# Patient Record
Sex: FEMALE | Race: WHITE | NOT HISPANIC OR LATINO
[De-identification: names, ages, dates, MRNs, and addresses within clinical notes are randomized per-mention and may not be internally consistent; named-entity substitution may affect disease eponyms.]

---

## 2017-01-01 ENCOUNTER — TRANSCRIPTION ENCOUNTER (OUTPATIENT)
Age: 47
End: 2017-01-01

## 2017-02-18 ENCOUNTER — RX RENEWAL (OUTPATIENT)
Age: 47
End: 2017-02-18

## 2017-05-09 LAB — HBA1C MFR BLD HPLC: 5.2

## 2017-05-18 ENCOUNTER — APPOINTMENT (OUTPATIENT)
Dept: FAMILY MEDICINE | Facility: CLINIC | Age: 47
End: 2017-05-18

## 2017-05-18 ENCOUNTER — NON-APPOINTMENT (OUTPATIENT)
Age: 47
End: 2017-05-18

## 2017-05-18 VITALS
RESPIRATION RATE: 14 BRPM | TEMPERATURE: 98.4 F | HEART RATE: 60 BPM | DIASTOLIC BLOOD PRESSURE: 60 MMHG | HEIGHT: 62 IN | OXYGEN SATURATION: 98 % | WEIGHT: 124 LBS | SYSTOLIC BLOOD PRESSURE: 100 MMHG | BODY MASS INDEX: 22.82 KG/M2

## 2017-07-06 ENCOUNTER — APPOINTMENT (OUTPATIENT)
Dept: FAMILY MEDICINE | Facility: CLINIC | Age: 47
End: 2017-07-06

## 2017-07-06 VITALS — SYSTOLIC BLOOD PRESSURE: 106 MMHG | TEMPERATURE: 98.4 F | DIASTOLIC BLOOD PRESSURE: 60 MMHG

## 2017-07-06 DIAGNOSIS — F51.4 SLEEP TERRORS [NIGHT TERRORS]: ICD-10-CM

## 2017-07-06 DIAGNOSIS — S03.00XA DISLOCATION OF JAW, UNSPECIFIED SIDE, INITIAL ENCOUNTER: ICD-10-CM

## 2017-09-05 ENCOUNTER — TRANSCRIPTION ENCOUNTER (OUTPATIENT)
Age: 47
End: 2017-09-05

## 2017-09-28 ENCOUNTER — MEDICATION RENEWAL (OUTPATIENT)
Age: 47
End: 2017-09-28

## 2017-10-17 ENCOUNTER — APPOINTMENT (OUTPATIENT)
Dept: FAMILY MEDICINE | Facility: CLINIC | Age: 47
End: 2017-10-17
Payer: COMMERCIAL

## 2017-10-17 VITALS — TEMPERATURE: 98.2 F

## 2017-10-17 PROCEDURE — G0008: CPT

## 2017-10-17 PROCEDURE — 90688 IIV4 VACCINE SPLT 0.5 ML IM: CPT

## 2018-05-15 ENCOUNTER — APPOINTMENT (OUTPATIENT)
Dept: FAMILY MEDICINE | Facility: CLINIC | Age: 48
End: 2018-05-15
Payer: COMMERCIAL

## 2018-05-15 ENCOUNTER — NON-APPOINTMENT (OUTPATIENT)
Age: 48
End: 2018-05-15

## 2018-05-15 VITALS
OXYGEN SATURATION: 97 % | SYSTOLIC BLOOD PRESSURE: 130 MMHG | BODY MASS INDEX: 22.63 KG/M2 | RESPIRATION RATE: 17 BRPM | HEART RATE: 65 BPM | TEMPERATURE: 98.6 F | WEIGHT: 123 LBS | DIASTOLIC BLOOD PRESSURE: 70 MMHG | HEIGHT: 62 IN

## 2018-05-15 PROCEDURE — 93000 ELECTROCARDIOGRAM COMPLETE: CPT

## 2018-05-15 PROCEDURE — 99396 PREV VISIT EST AGE 40-64: CPT | Mod: 25

## 2018-05-15 RX ORDER — NAPROXEN 500 MG/1
500 TABLET ORAL
Qty: 30 | Refills: 1 | Status: COMPLETED | COMMUNITY
Start: 2017-07-06 | End: 2018-05-15

## 2018-05-15 NOTE — HEALTH RISK ASSESSMENT
[Patient reported mammogram was normal] : Patient reported mammogram was normal [Patient reported PAP Smear was normal] : Patient reported PAP Smear was normal [MammogramDate] : 4/17 [PapSmearDate] : 10/ 2017

## 2018-05-15 NOTE — PHYSICAL EXAM

## 2018-05-15 NOTE — ASSESSMENT
[FreeTextEntry1] : Pt presents for his routine physical exam. Patient states that  she is having less problems with her allergies. Patient is no longer grinding and clenching her teeth. Patient does however have some anxiety and stress especially before bed does take a quarter of a thallium every couple of nights to help with her sleep. Patient states that 4 weeks ago she developed a rash in her groin area which seems to be getting worse and spreading  not itchy\par EKG NSR\par Labs done today\par Pt to start Triamcinolone cream BID

## 2018-05-15 NOTE — HISTORY OF PRESENT ILLNESS
[de-identified] : Pt presents for his routine physical exam. Patient states that  she is having less problems with her allergies. Patient is no longer grinding and clenching her teeth. Patient does however have some anxiety and stress especially before bed does take a quarter of a thallium every couple of nights to help with her sleep. Patient states that 4 weeks ago she developed a rash in her groin area which seems to be getting worse and spreading  not itchy

## 2018-05-21 ENCOUNTER — MESSAGE (OUTPATIENT)
Age: 48
End: 2018-05-21

## 2018-06-23 LAB
25(OH)D3 SERPL-MCNC: 32.8 NG/ML
ALBUMIN SERPL ELPH-MCNC: 4.6 G/DL
ALP BLD-CCNC: 58 U/L
ALT SERPL-CCNC: 16 U/L
ANION GAP SERPL CALC-SCNC: 15 MMOL/L
AST SERPL-CCNC: 29 U/L
BASOPHILS # BLD AUTO: 0.02 K/UL
BASOPHILS NFR BLD AUTO: 0.3 %
BILIRUB SERPL-MCNC: 0.3 MG/DL
BUN SERPL-MCNC: 20 MG/DL
CALCIUM SERPL-MCNC: 9.6 MG/DL
CHLORIDE SERPL-SCNC: 104 MMOL/L
CHOLEST SERPL-MCNC: 165 MG/DL
CHOLEST/HDLC SERPL: 2.5 RATIO
CO2 SERPL-SCNC: 24 MMOL/L
CREAT SERPL-MCNC: 0.84 MG/DL
EOSINOPHIL # BLD AUTO: 0.1 K/UL
EOSINOPHIL NFR BLD AUTO: 1.4 %
FOLATE SERPL-MCNC: >20 NG/ML
GGT SERPL-CCNC: 5 U/L
GLUCOSE SERPL-MCNC: 61 MG/DL
HBA1C MFR BLD HPLC: 5.2 %
HCT VFR BLD CALC: 38.2 %
HDLC SERPL-MCNC: 67 MG/DL
HGB BLD-MCNC: 12.6 G/DL
IMM GRANULOCYTES NFR BLD AUTO: 0.1 %
IRON SERPL-MCNC: 65 UG/DL
LDLC SERPL CALC-MCNC: 80 MG/DL
LYMPHOCYTES # BLD AUTO: 2.18 K/UL
LYMPHOCYTES NFR BLD AUTO: 31.3 %
MAN DIFF?: NORMAL
MCHC RBC-ENTMCNC: 31.9 PG
MCHC RBC-ENTMCNC: 33 GM/DL
MCV RBC AUTO: 96.7 FL
MONOCYTES # BLD AUTO: 0.46 K/UL
MONOCYTES NFR BLD AUTO: 6.6 %
NEUTROPHILS # BLD AUTO: 4.2 K/UL
NEUTROPHILS NFR BLD AUTO: 60.3 %
PLATELET # BLD AUTO: 229 K/UL
POTASSIUM SERPL-SCNC: 4.6 MMOL/L
PROT SERPL-MCNC: 6.9 G/DL
RBC # BLD: 3.95 M/UL
RBC # FLD: 12.5 %
SODIUM SERPL-SCNC: 143 MMOL/L
TRIGL SERPL-MCNC: 91 MG/DL
TSH SERPL-ACNC: 3.01 UIU/ML
VIT B12 SERPL-MCNC: 933 PG/ML
WBC # FLD AUTO: 6.97 K/UL

## 2018-07-03 ENCOUNTER — APPOINTMENT (OUTPATIENT)
Dept: FAMILY MEDICINE | Facility: CLINIC | Age: 48
End: 2018-07-03
Payer: COMMERCIAL

## 2018-07-03 VITALS — SYSTOLIC BLOOD PRESSURE: 98 MMHG | DIASTOLIC BLOOD PRESSURE: 58 MMHG | TEMPERATURE: 98.6 F

## 2018-07-03 PROCEDURE — 87880 STREP A ASSAY W/OPTIC: CPT | Mod: QW

## 2018-07-03 PROCEDURE — 99213 OFFICE O/P EST LOW 20 MIN: CPT | Mod: 25

## 2018-07-03 NOTE — REVIEW OF SYSTEMS
[Fatigue] : fatigue [Sore Throat] : sore throat [Postnasal Drip] : postnasal drip [Negative] : Heme/Lymph

## 2018-07-03 NOTE — ASSESSMENT
[FreeTextEntry1] : Presents to office for\par  sore throat for the last 3 days. Patient states the sore throat is resolved he now has more upper respiratory symptoms with nasal congestion. Denies any fever or chills or coughing.Patient has been taking OTC meds with some relief.\par \par RST- neg \par pt instructed to start zinc continue meds

## 2018-07-03 NOTE — PHYSICAL EXAM
[No Acute Distress] : no acute distress [No JVD] : no jugular venous distention [Clear to Auscultation] : lungs were clear to auscultation bilaterally [Regular Rhythm] : with a regular rhythm [Normal Anterior Cervical Nodes] : no anterior cervical lymphadenopathy [de-identified] : mildly injected throat

## 2018-07-03 NOTE — HISTORY OF PRESENT ILLNESS
[FreeTextEntry8] : Presents to office for\par  sore throat for the last 3 days. Patient states the sore throat is resolved he now has more upper respiratory symptoms with nasal congestion. Denies any fever or chills or coughing.Patient has been taking OTC meds with some relief.\par

## 2018-07-06 LAB — S PYO AG SPEC QL IA: NEGATIVE

## 2018-09-10 ENCOUNTER — TRANSCRIPTION ENCOUNTER (OUTPATIENT)
Age: 48
End: 2018-09-10

## 2018-12-10 ENCOUNTER — TRANSCRIPTION ENCOUNTER (OUTPATIENT)
Age: 48
End: 2018-12-10

## 2019-06-02 ENCOUNTER — TRANSCRIPTION ENCOUNTER (OUTPATIENT)
Age: 49
End: 2019-06-02

## 2019-09-03 ENCOUNTER — APPOINTMENT (OUTPATIENT)
Dept: FAMILY MEDICINE | Facility: CLINIC | Age: 49
End: 2019-09-03
Payer: COMMERCIAL

## 2019-09-03 ENCOUNTER — NON-APPOINTMENT (OUTPATIENT)
Age: 49
End: 2019-09-03

## 2019-09-03 VITALS
HEART RATE: 60 BPM | RESPIRATION RATE: 14 BRPM | SYSTOLIC BLOOD PRESSURE: 104 MMHG | OXYGEN SATURATION: 98 % | DIASTOLIC BLOOD PRESSURE: 60 MMHG | TEMPERATURE: 99 F | HEIGHT: 62 IN | WEIGHT: 124 LBS | BODY MASS INDEX: 22.82 KG/M2

## 2019-09-03 DIAGNOSIS — J30.9 ALLERGIC RHINITIS, UNSPECIFIED: ICD-10-CM

## 2019-09-03 PROCEDURE — 99396 PREV VISIT EST AGE 40-64: CPT | Mod: 25

## 2019-09-03 PROCEDURE — 93000 ELECTROCARDIOGRAM COMPLETE: CPT

## 2019-09-03 NOTE — HISTORY OF PRESENT ILLNESS
[de-identified] : 49y/o female w/ PMHx of allergic rhinitis, chronic constipation \par \par presents to the office for routine annual physical exam.  \par  \par Offers no new complaints at this time. All routine labs review with patient and WNL \par   last Mammo in, last PAP, UTD. Last colonoscopy 10 years ago normal\par +FHx of Colon Cancer\par \par  \par

## 2019-09-03 NOTE — HEALTH RISK ASSESSMENT
[Patient reported mammogram was normal] : Patient reported mammogram was normal [Patient reported PAP Smear was normal] : Patient reported PAP Smear was normal [Patient reported colonoscopy was normal] : Patient reported colonoscopy was normal [MammogramDate] : 8/19 [PapSmearDate] : 1/19 [ColonoscopyDate] : 2009

## 2019-09-08 LAB
CYTOLOGY CVX/VAG DOC THIN PREP: NORMAL
MEV IGG FLD QL IA: >300 AU/ML
MEV IGG+IGM SER-IMP: POSITIVE

## 2019-09-23 ENCOUNTER — APPOINTMENT (OUTPATIENT)
Dept: FAMILY MEDICINE | Facility: CLINIC | Age: 49
End: 2019-09-23
Payer: COMMERCIAL

## 2019-09-23 VITALS — TEMPERATURE: 98.1 F

## 2019-09-23 DIAGNOSIS — Z87.09 PERSONAL HISTORY OF OTHER DISEASES OF THE RESPIRATORY SYSTEM: ICD-10-CM

## 2019-09-23 PROCEDURE — 99213 OFFICE O/P EST LOW 20 MIN: CPT | Mod: 25

## 2019-09-23 PROCEDURE — 87880 STREP A ASSAY W/OPTIC: CPT | Mod: QW

## 2019-09-23 NOTE — ADDENDUM
[FreeTextEntry1] : I, Bria Shirley, acted solely as a scribe for Dr. Stanford on this date 09/23/2019.\par \par All medical record entries made by the Scribe were at my, Dr. Stanford, direction and personally dictated by me on 09/23/2019. I have reviewed the chart and agree that the record accurately reflects my personal performance of the history, physical exam, assessment and plan.  I have also personally directed, reviewed and agreed with the chart.

## 2019-09-23 NOTE — PHYSICAL EXAM
[Normal] : normal rate, regular rhythm, normal S1/S2, no murmur [de-identified] : PND, throat injected, mucus [de-identified] : AA&Ox3

## 2019-09-23 NOTE — HISTORY OF PRESENT ILLNESS
[FreeTextEntry8] : 48y/o F with PMHx of Anxiety presents to the office with c/o URI symptoms that started 4 days ago with PND, then fever the next morning. Additional sx of sore throat, painful swallowing, and congestion. Pt has taken Claritin-D for the past 2 days which she reports relieves sx. She also took Sudafed yesterday, which did not help. Pt reports having surgery in 4 days.

## 2019-09-23 NOTE — PLAN
[FreeTextEntry1] : URI\par - Rapid Strep Test results: negative\par - Flonase 2x/day\par - Continue Claritin-D for 1 wk\par - Hydration / Supportive care\par - Hygiene caution to prevent transmission\par - Gargle with salt water,  Apple Cider Vinegar or boil,  strain, cool and drink sore throat cocktail - (red onion skins, lemon rinds, fresh alex and honey)

## 2019-09-23 NOTE — REVIEW OF SYSTEMS
[Nasal Discharge] : nasal discharge [Postnasal Drip] : postnasal drip [Sore Throat] : sore throat [Cough] : cough [Negative] : Integumentary

## 2019-09-24 LAB — S PYO AG SPEC QL IA: NEGATIVE

## 2020-03-11 ENCOUNTER — APPOINTMENT (OUTPATIENT)
Dept: FAMILY MEDICINE | Facility: CLINIC | Age: 50
End: 2020-03-11
Payer: COMMERCIAL

## 2020-03-11 VITALS
RESPIRATION RATE: 18 BRPM | TEMPERATURE: 97.9 F | OXYGEN SATURATION: 98 % | SYSTOLIC BLOOD PRESSURE: 108 MMHG | HEART RATE: 64 BPM | DIASTOLIC BLOOD PRESSURE: 64 MMHG

## 2020-03-11 PROCEDURE — 99212 OFFICE O/P EST SF 10 MIN: CPT

## 2020-03-11 NOTE — PHYSICAL EXAM
[No Lymphadenopathy] : no lymphadenopathy [Normal] : affect was normal and insight and judgment were intact

## 2020-03-12 NOTE — HISTORY OF PRESENT ILLNESS
[FreeTextEntry8] : 50 yo female with seasonal and environmental allergies presents to the office for clearance to return to work. the patient reports her  was recently in a building where someone tested positively for covid-19. the patient's  did not have direct contact with the positive patient, and the patient presents today and is asymptomatic.

## 2020-03-13 ENCOUNTER — APPOINTMENT (OUTPATIENT)
Dept: FAMILY MEDICINE | Facility: CLINIC | Age: 50
End: 2020-03-13

## 2020-09-22 ENCOUNTER — APPOINTMENT (OUTPATIENT)
Dept: FAMILY MEDICINE | Facility: CLINIC | Age: 50
End: 2020-09-22
Payer: COMMERCIAL

## 2020-09-22 VITALS — DIASTOLIC BLOOD PRESSURE: 70 MMHG | SYSTOLIC BLOOD PRESSURE: 110 MMHG | TEMPERATURE: 98.6 F

## 2020-09-22 DIAGNOSIS — Z87.09 PERSONAL HISTORY OF OTHER DISEASES OF THE RESPIRATORY SYSTEM: ICD-10-CM

## 2020-09-22 DIAGNOSIS — R59.9 ENLARGED LYMPH NODES, UNSPECIFIED: ICD-10-CM

## 2020-09-22 PROCEDURE — 99213 OFFICE O/P EST LOW 20 MIN: CPT

## 2020-09-22 NOTE — HISTORY OF PRESENT ILLNESS
[FreeTextEntry8] : 51 y/o F presents to office with a 7 day hx of sinus congestion and nasal discharge and now swollen gland right>Left. Took OTC meds without relief. Denies fever. Admits to sore throat,post nasal drip and fatigue.Denies HA or facial pain.

## 2020-09-22 NOTE — PHYSICAL EXAM
[Normal Outer Ear/Nose] : the outer ears and nose were normal in appearance [Normal] : no rash [de-identified] : right tonsil enlarged and injected [de-identified] : enlarged right cervical node TTP

## 2020-09-29 ENCOUNTER — APPOINTMENT (OUTPATIENT)
Dept: GASTROENTEROLOGY | Facility: CLINIC | Age: 50
End: 2020-09-29
Payer: COMMERCIAL

## 2020-09-29 VITALS
SYSTOLIC BLOOD PRESSURE: 104 MMHG | WEIGHT: 124 LBS | OXYGEN SATURATION: 97 % | HEIGHT: 62 IN | BODY MASS INDEX: 22.82 KG/M2 | TEMPERATURE: 98.5 F | DIASTOLIC BLOOD PRESSURE: 68 MMHG

## 2020-09-29 DIAGNOSIS — Z12.11 ENCOUNTER FOR SCREENING FOR MALIGNANT NEOPLASM OF COLON: ICD-10-CM

## 2020-09-29 PROCEDURE — 99242 OFF/OP CONSLTJ NEW/EST SF 20: CPT

## 2020-09-29 NOTE — ASSESSMENT
[FreeTextEntry1] : \par history of colon polyps\par \par Discussed risks of the procedure -  including but not limited to bleeding, infection, drug reaction, perforation and missed lesion.  Also discussed benefits and alternatives of this procedure with patient - including no treatment  - and the patient consents to undergoing the procedure.\par

## 2020-09-29 NOTE — HISTORY OF PRESENT ILLNESS
[FreeTextEntry1] : Furloughed form work at [employer]\par \par Had colonoscopy 2005 - has sen BRB on tp - told of hemorrhoids and one polyp\par \par Tends to get diarrhea before menses\par \par \par

## 2020-09-29 NOTE — REVIEW OF SYSTEMS
[Constipation] : constipation [Negative] : Respiratory [Fever] : no fever [Recent Weight Gain (___ Lbs)] : no recent weight gain [Recent Weight Loss (___ Lbs)] : no recent weight loss

## 2020-09-30 RX ORDER — SODIUM PICOSULFATE, MAGNESIUM OXIDE, AND ANHYDROUS CITRIC ACID 10; 3.5; 12 MG/160ML; G/160ML; G/160ML
10-3.5-12 MG-GM LIQUID ORAL
Qty: 1 | Refills: 0 | Status: ACTIVE | COMMUNITY
Start: 2020-09-30 | End: 1900-01-01

## 2020-10-04 ENCOUNTER — APPOINTMENT (OUTPATIENT)
Dept: DISASTER EMERGENCY | Facility: CLINIC | Age: 50
End: 2020-10-04

## 2020-10-05 LAB — SARS-COV-2 N GENE NPH QL NAA+PROBE: NOT DETECTED

## 2020-10-07 ENCOUNTER — LABORATORY RESULT (OUTPATIENT)
Age: 50
End: 2020-10-07

## 2020-10-07 ENCOUNTER — APPOINTMENT (OUTPATIENT)
Dept: GASTROENTEROLOGY | Facility: CLINIC | Age: 50
End: 2020-10-07
Payer: COMMERCIAL

## 2020-10-07 PROCEDURE — 81025 URINE PREGNANCY TEST: CPT

## 2020-10-07 PROCEDURE — 45380 COLONOSCOPY AND BIOPSY: CPT | Mod: 59

## 2020-10-07 PROCEDURE — 45385 COLONOSCOPY W/LESION REMOVAL: CPT

## 2020-10-12 LAB
HCG UR QL: NEGATIVE
QUALITY CONTROL: YES

## 2020-10-13 ENCOUNTER — TRANSCRIPTION ENCOUNTER (OUTPATIENT)
Age: 50
End: 2020-10-13

## 2020-10-14 ENCOUNTER — LABORATORY RESULT (OUTPATIENT)
Age: 50
End: 2020-10-14

## 2020-10-27 ENCOUNTER — NON-APPOINTMENT (OUTPATIENT)
Age: 50
End: 2020-10-27

## 2020-10-27 ENCOUNTER — APPOINTMENT (OUTPATIENT)
Dept: FAMILY MEDICINE | Facility: CLINIC | Age: 50
End: 2020-10-27
Payer: COMMERCIAL

## 2020-10-27 VITALS
DIASTOLIC BLOOD PRESSURE: 60 MMHG | RESPIRATION RATE: 14 BRPM | TEMPERATURE: 98.9 F | HEIGHT: 62 IN | HEART RATE: 69 BPM | SYSTOLIC BLOOD PRESSURE: 110 MMHG | OXYGEN SATURATION: 98 % | BODY MASS INDEX: 23 KG/M2 | WEIGHT: 125 LBS

## 2020-10-27 DIAGNOSIS — R59.0 LOCALIZED ENLARGED LYMPH NODES: ICD-10-CM

## 2020-10-27 DIAGNOSIS — Z23 ENCOUNTER FOR IMMUNIZATION: ICD-10-CM

## 2020-10-27 DIAGNOSIS — N39.0 URINARY TRACT INFECTION, SITE NOT SPECIFIED: ICD-10-CM

## 2020-10-27 PROCEDURE — 90686 IIV4 VACC NO PRSV 0.5 ML IM: CPT

## 2020-10-27 PROCEDURE — 93000 ELECTROCARDIOGRAM COMPLETE: CPT | Mod: 59

## 2020-10-27 PROCEDURE — 99396 PREV VISIT EST AGE 40-64: CPT | Mod: 25

## 2020-10-27 PROCEDURE — G0008: CPT

## 2020-10-27 PROCEDURE — 81003 URINALYSIS AUTO W/O SCOPE: CPT | Mod: QW

## 2020-10-27 PROCEDURE — 99072 ADDL SUPL MATRL&STAF TM PHE: CPT

## 2020-10-28 PROBLEM — R59.0 CERVICAL LYMPHADENOPATHY: Status: ACTIVE | Noted: 2020-10-28

## 2020-10-28 NOTE — HISTORY OF PRESENT ILLNESS
[de-identified] : 49 y/o F PMHx Recent dx of B-Cell Lymphoma presents to office for routine PE. Pt had colonoscopy with dx of lymphoma.pt will be seeing Oncologist next week.Pt had abnormal Mammo. Breast bx was attempted but failed to find location of breast distortion noted on Mammogram. Will get another Mammo in 6 months. Breast US NEG. Still has right sided cervical LN non tender. Had UTI 3 weeks ago now resolved (took Macrobid)Wakes up i n the am with discomfort in hands and fingers. Some stiffness no swelling no redness lasts for 30 minutes.Falls asleep on  her back head to side then can  sleeps on side.Denies numbness or tingling. Some fatigue despite sleeping 8 hours nightly.

## 2020-10-28 NOTE — PHYSICAL EXAM
[No Acute Distress] : no acute distress [Well Nourished] : well nourished [Well Developed] : well developed [Well-Appearing] : well-appearing [Normal Sclera/Conjunctiva] : normal sclera/conjunctiva [PERRL] : pupils equal round and reactive to light [EOMI] : extraocular movements intact [Normal Outer Ear/Nose] : the outer ears and nose were normal in appearance [Normal Oropharynx] : the oropharynx was normal [No JVD] : no jugular venous distention [Supple] : supple [Thyroid Normal, No Nodules] : the thyroid was normal and there were no nodules present [No Respiratory Distress] : no respiratory distress  [No Accessory Muscle Use] : no accessory muscle use [Clear to Auscultation] : lungs were clear to auscultation bilaterally [Normal Rate] : normal rate  [Regular Rhythm] : with a regular rhythm [Normal S1, S2] : normal S1 and S2 [No Murmur] : no murmur heard [No Carotid Bruits] : no carotid bruits [No Abdominal Bruit] : a ~M bruit was not heard ~T in the abdomen [No Varicosities] : no varicosities [Pedal Pulses Present] : the pedal pulses are present [No Edema] : there was no peripheral edema [No Palpable Aorta] : no palpable aorta [No Extremity Clubbing/Cyanosis] : no extremity clubbing/cyanosis [Soft] : abdomen soft [Non Tender] : non-tender [Non-distended] : non-distended [No Masses] : no abdominal mass palpated [No HSM] : no HSM [Normal Bowel Sounds] : normal bowel sounds [Normal Posterior Cervical Nodes] : no posterior cervical lymphadenopathy [No CVA Tenderness] : no CVA  tenderness [No Spinal Tenderness] : no spinal tenderness [No Joint Swelling] : no joint swelling [Grossly Normal Strength/Tone] : grossly normal strength/tone [No Rash] : no rash [Coordination Grossly Intact] : coordination grossly intact [No Focal Deficits] : no focal deficits [Normal Gait] : normal gait [Normal Affect] : the affect was normal [Normal Insight/Judgement] : insight and judgment were intact [de-identified] : Right cervical LN enlarged non tender [de-identified] : R ant cervical LN enlarged Not tender

## 2020-10-31 ENCOUNTER — OUTPATIENT (OUTPATIENT)
Dept: OUTPATIENT SERVICES | Facility: HOSPITAL | Age: 50
LOS: 1 days | Discharge: HOME | End: 2020-10-31

## 2020-10-31 DIAGNOSIS — C85.90 NON-HODGKIN LYMPHOMA, UNSPECIFIED, UNSPECIFIED SITE: ICD-10-CM

## 2020-11-05 ENCOUNTER — APPOINTMENT (OUTPATIENT)
Dept: HEMATOLOGY ONCOLOGY | Facility: CLINIC | Age: 50
End: 2020-11-05
Payer: COMMERCIAL

## 2020-11-05 ENCOUNTER — RESULT REVIEW (OUTPATIENT)
Age: 50
End: 2020-11-05

## 2020-11-05 VITALS
WEIGHT: 127.87 LBS | OXYGEN SATURATION: 100 % | DIASTOLIC BLOOD PRESSURE: 74 MMHG | RESPIRATION RATE: 18 BRPM | SYSTOLIC BLOOD PRESSURE: 111 MMHG | HEIGHT: 62.8 IN | TEMPERATURE: 98.2 F | BODY MASS INDEX: 22.66 KG/M2 | HEART RATE: 58 BPM

## 2020-11-05 DIAGNOSIS — Z87.891 PERSONAL HISTORY OF NICOTINE DEPENDENCE: ICD-10-CM

## 2020-11-05 DIAGNOSIS — Z78.9 OTHER SPECIFIED HEALTH STATUS: ICD-10-CM

## 2020-11-05 DIAGNOSIS — Z83.71 FAMILY HISTORY OF COLONIC POLYPS: ICD-10-CM

## 2020-11-05 DIAGNOSIS — C82.00 FOLLICULAR LYMPHOMA GRADE I, UNSPECIFIED SITE: ICD-10-CM

## 2020-11-05 LAB
BASOPHILS # BLD AUTO: 0.02 K/UL — SIGNIFICANT CHANGE UP (ref 0–0.2)
BASOPHILS NFR BLD AUTO: 0.3 % — SIGNIFICANT CHANGE UP (ref 0–2)
EOSINOPHIL # BLD AUTO: 0.08 K/UL — SIGNIFICANT CHANGE UP (ref 0–0.5)
EOSINOPHIL NFR BLD AUTO: 1.2 % — SIGNIFICANT CHANGE UP (ref 0–6)
HCT VFR BLD CALC: 35.4 % — SIGNIFICANT CHANGE UP (ref 34.5–45)
HGB BLD-MCNC: 12.1 G/DL — SIGNIFICANT CHANGE UP (ref 11.5–15.5)
IMM GRANULOCYTES NFR BLD AUTO: 1 % — SIGNIFICANT CHANGE UP (ref 0–1.5)
LYMPHOCYTES # BLD AUTO: 1.35 K/UL — SIGNIFICANT CHANGE UP (ref 1–3.3)
LYMPHOCYTES # BLD AUTO: 19.9 % — SIGNIFICANT CHANGE UP (ref 13–44)
MCHC RBC-ENTMCNC: 32.4 PG — SIGNIFICANT CHANGE UP (ref 27–34)
MCHC RBC-ENTMCNC: 34.2 G/DL — SIGNIFICANT CHANGE UP (ref 32–36)
MCV RBC AUTO: 94.7 FL — SIGNIFICANT CHANGE UP (ref 80–100)
MONOCYTES # BLD AUTO: 0.51 K/UL — SIGNIFICANT CHANGE UP (ref 0–0.9)
MONOCYTES NFR BLD AUTO: 7.5 % — SIGNIFICANT CHANGE UP (ref 2–14)
NEUTROPHILS # BLD AUTO: 4.75 K/UL — SIGNIFICANT CHANGE UP (ref 1.8–7.4)
NEUTROPHILS NFR BLD AUTO: 70.1 % — SIGNIFICANT CHANGE UP (ref 43–77)
NRBC # BLD: 0 /100 WBCS — SIGNIFICANT CHANGE UP (ref 0–0)
PLATELET # BLD AUTO: 203 K/UL — SIGNIFICANT CHANGE UP (ref 150–400)
RBC # BLD: 3.74 M/UL — LOW (ref 3.8–5.2)
RBC # FLD: 11.9 % — SIGNIFICANT CHANGE UP (ref 10.3–14.5)
WBC # BLD: 6.78 K/UL — SIGNIFICANT CHANGE UP (ref 3.8–10.5)
WBC # FLD AUTO: 6.78 K/UL — SIGNIFICANT CHANGE UP (ref 3.8–10.5)

## 2020-11-05 PROCEDURE — 99072 ADDL SUPL MATRL&STAF TM PHE: CPT

## 2020-11-05 PROCEDURE — 99205 OFFICE O/P NEW HI 60 MIN: CPT

## 2020-11-05 PROCEDURE — 99354: CPT | Mod: 25

## 2020-11-06 ENCOUNTER — TRANSCRIPTION ENCOUNTER (OUTPATIENT)
Age: 50
End: 2020-11-06

## 2020-11-08 PROBLEM — Z83.71 FAMILY HISTORY OF COLONIC POLYPS: Status: ACTIVE | Noted: 2020-09-29

## 2020-11-08 LAB
ALBUMIN MFR SERPL ELPH: 66 %
ALBUMIN SERPL ELPH-MCNC: 4.7 G/DL
ALBUMIN SERPL-MCNC: 4.2 G/DL
ALBUMIN/GLOB SERPL: 1.9 RATIO
ALP BLD-CCNC: 56 U/L
ALPHA1 GLOB MFR SERPL ELPH: 3.8 %
ALPHA1 GLOB SERPL ELPH-MCNC: 0.2 G/DL
ALPHA2 GLOB MFR SERPL ELPH: 8.2 %
ALPHA2 GLOB SERPL ELPH-MCNC: 0.5 G/DL
ALT SERPL-CCNC: 23 U/L
ANION GAP SERPL CALC-SCNC: 7 MMOL/L
AST SERPL-CCNC: 28 U/L
B-GLOBULIN MFR SERPL ELPH: 9.3 %
B-GLOBULIN SERPL ELPH-MCNC: 0.6 G/DL
B2 MICROGLOB SERPL-MCNC: 1.6 MG/L
BILIRUB SERPL-MCNC: 0.4 MG/DL
BUN SERPL-MCNC: 11 MG/DL
CALCIUM SERPL-MCNC: 9.9 MG/DL
CHLORIDE SERPL-SCNC: 103 MMOL/L
CO2 SERPL-SCNC: 30 MMOL/L
CREAT SERPL-MCNC: 0.72 MG/DL
DEPRECATED KAPPA LC FREE/LAMBDA SER: 1.16 RATIO
FERRITIN SERPL-MCNC: 52 NG/ML
GAMMA GLOB FLD ELPH-MCNC: 0.8 G/DL
GAMMA GLOB MFR SERPL ELPH: 12.7 %
GLUCOSE SERPL-MCNC: 88 MG/DL
HBV CORE IGG+IGM SER QL: NONREACTIVE
HBV SURFACE AB SER QL: NONREACTIVE
HBV SURFACE AG SER QL: NONREACTIVE
HCV AB SER QL: NONREACTIVE
HCV S/CO RATIO: 0.06 S/CO
HIV1+2 AB SPEC QL IA.RAPID: NONREACTIVE
IGA SER QL IEP: 159 MG/DL
IGG SER QL IEP: 866 MG/DL
IGM SER QL IEP: 68 MG/DL
INTERPRETATION SERPL IEP-IMP: NORMAL
IRON SATN MFR SERPL: 29 %
IRON SERPL-MCNC: 76 UG/DL
KAPPA LC CSF-MCNC: 0.83 MG/DL
KAPPA LC SERPL-MCNC: 0.96 MG/DL
LDH SERPL-CCNC: 141 U/L
POTASSIUM SERPL-SCNC: 4.9 MMOL/L
PROT SERPL-MCNC: 6.4 G/DL
SODIUM SERPL-SCNC: 140 MMOL/L
TIBC SERPL-MCNC: 261 UG/DL
UIBC SERPL-MCNC: 185 UG/DL

## 2020-11-08 RX ORDER — NITROFURANTOIN (MONOHYDRATE/MACROCRYSTALS) 25; 75 MG/1; MG/1
100 CAPSULE ORAL
Qty: 10 | Refills: 0 | Status: ACTIVE | COMMUNITY
Start: 2020-10-13

## 2020-11-22 ENCOUNTER — OUTPATIENT (OUTPATIENT)
Dept: OUTPATIENT SERVICES | Facility: HOSPITAL | Age: 50
LOS: 1 days | End: 2020-11-22
Payer: COMMERCIAL

## 2020-11-22 ENCOUNTER — APPOINTMENT (OUTPATIENT)
Dept: NUCLEAR MEDICINE | Facility: IMAGING CENTER | Age: 50
End: 2020-11-22
Payer: COMMERCIAL

## 2020-11-22 DIAGNOSIS — C85.10 UNSPECIFIED B-CELL LYMPHOMA, UNSPECIFIED SITE: ICD-10-CM

## 2020-11-22 DIAGNOSIS — Z00.8 ENCOUNTER FOR OTHER GENERAL EXAMINATION: ICD-10-CM

## 2020-11-22 PROCEDURE — 78815 PET IMAGE W/CT SKULL-THIGH: CPT | Mod: 26,PI

## 2020-11-22 PROCEDURE — 78815 PET IMAGE W/CT SKULL-THIGH: CPT

## 2020-11-22 PROCEDURE — A9552: CPT

## 2020-11-24 ENCOUNTER — TRANSCRIPTION ENCOUNTER (OUTPATIENT)
Age: 50
End: 2020-11-24

## 2020-11-25 ENCOUNTER — TRANSCRIPTION ENCOUNTER (OUTPATIENT)
Age: 50
End: 2020-11-25

## 2020-12-04 ENCOUNTER — APPOINTMENT (OUTPATIENT)
Dept: ULTRASOUND IMAGING | Facility: IMAGING CENTER | Age: 50
End: 2020-12-04
Payer: COMMERCIAL

## 2020-12-04 ENCOUNTER — RESULT REVIEW (OUTPATIENT)
Age: 50
End: 2020-12-04

## 2020-12-04 ENCOUNTER — OUTPATIENT (OUTPATIENT)
Dept: OUTPATIENT SERVICES | Facility: HOSPITAL | Age: 50
LOS: 1 days | End: 2020-12-04
Payer: COMMERCIAL

## 2020-12-04 ENCOUNTER — TRANSCRIPTION ENCOUNTER (OUTPATIENT)
Age: 50
End: 2020-12-04

## 2020-12-04 DIAGNOSIS — C82.00 FOLLICULAR LYMPHOMA GRADE I, UNSPECIFIED SITE: ICD-10-CM

## 2020-12-04 PROCEDURE — 76942 ECHO GUIDE FOR BIOPSY: CPT

## 2020-12-04 PROCEDURE — 38505 NEEDLE BIOPSY LYMPH NODES: CPT | Mod: RT

## 2020-12-04 PROCEDURE — 76942 ECHO GUIDE FOR BIOPSY: CPT | Mod: 26

## 2020-12-04 PROCEDURE — 38505 NEEDLE BIOPSY LYMPH NODES: CPT

## 2020-12-21 PROBLEM — Z87.09 HISTORY OF PHARYNGITIS: Status: RESOLVED | Noted: 2019-09-23 | Resolved: 2020-12-21

## 2020-12-23 PROBLEM — Z12.11 ENCOUNTER FOR SCREENING COLONOSCOPY: Status: RESOLVED | Noted: 2020-09-29 | Resolved: 2020-12-23

## 2020-12-23 PROBLEM — N39.0 ACUTE UTI: Status: RESOLVED | Noted: 2020-10-28 | Resolved: 2020-12-23

## 2020-12-23 PROBLEM — Z87.09 HISTORY OF ACUTE PHARYNGITIS: Status: RESOLVED | Noted: 2018-07-03 | Resolved: 2020-12-23

## 2020-12-26 NOTE — ASSESSMENT
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  The polyp has been excised.  Non-Hodgkin's lymphoma rarely presents in the GI tract.  When it does so, it most frequently presents in the stomach and small bowel, particularly the duodenum.  Large bowel presentations, particularly disease present in a polyp as has occurred here, is very rare.\par She has no significant GI symptoms and her CBC does not indicate blood loss.\par She will have a CMP, LDH, beta-2 microglobulin and  immunoprotein studies done along with viral serologies.\par A PET CT scan will be obtained.\par Bone marrow will be deferred unless otherwise indicated.\par Whether or not to treat versus observe and, if treatment is needed, what treatment to give, will be determined once staging is completed.  Follicular lymphomas with increased Ki-67 have been associated with a more aggressive clinical course but in this case it is not clear whether or not the elevated proliferation index reflects tumor activity versus proliferation in residual germinal centers.\par She will have repeat breast imaging done in next 6 months.\par Findings will be reviewed with the patient once these results are available. [Palliative Care Plan] : not applicable at this time

## 2020-12-26 NOTE — HISTORY OF PRESENT ILLNESS
[Disease:__________________________] : Disease: [unfilled] [de-identified] : Ms. Mccartney is a 50-year-old woman who presents for evaluation regarding recently diagnosed follicular lymphoma in a rectal polyp. She had a routine colonoscopy on 10/7/2020 which showed a hyperplastic polyp in the transverse colon and a 1.5 cm polyp in the rectum which showed a follicular lymphoma with a high proliferation index of 40 to 50%.  The polyp contained an atypical nodular lymphoid proliferation made up of mainly small to medium sized cells as well as scattered groups of large cells which may have been part of residual germinal centers.  IHC was positive for CD20, CD79a, PAX-5, CD10, partial BCL6, BCL2, and negative for CD5, CD43, cyclin D1 and MUM-1.  The high Ki–67 may have been reflective of cells in the residual germinal centers. \par Mucosal irregularities or ulcerations were not seen.\par She has had no GI complaints other than past hemorrhoidal bleeding.  She has had no constitutional complaints.\par She recently had tonsillitis with a prominent right cervical node  which has not entirely regressed.\par She also recently had a mammogram which revealed a questionable area of abnormality in the right breast which could not be localized for biopsy.\par  [de-identified] : pending [de-identified] : Initial visit.

## 2020-12-26 NOTE — PHYSICAL EXAM
[Fully active, able to carry on all pre-disease performance without restriction] : Status 0 - Fully active, able to carry on all pre-disease performance without restriction [Normal] : affect appropriate [de-identified] : no CVAT [de-identified] : subcm high right cervical node vs prominent SM gland, not tener

## 2020-12-26 NOTE — REVIEW OF SYSTEMS
[Negative] : Allergic/Immunologic [Fatigue] : fatigue [Joint Pain] : joint pain [Joint Stiffness] : joint stiffness [Fever] : no fever [Chills] : no chills [Night Sweats] : no night sweats [Recent Change In Weight] : ~T no recent weight change [Abdominal Pain] : no abdominal pain [Vomiting] : no vomiting [Constipation] : no constipation [Diarrhea] : no diarrhea [Muscle Pain] : no muscle pain [Muscle Weakness] : no muscle weakness [FreeTextEntry7] : As above [FreeTextEntry9] : Transient morning arthralgias affecting hands [de-identified] : As above

## 2021-01-03 ENCOUNTER — TRANSCRIPTION ENCOUNTER (OUTPATIENT)
Age: 51
End: 2021-01-03

## 2021-02-12 ENCOUNTER — TRANSCRIPTION ENCOUNTER (OUTPATIENT)
Age: 51
End: 2021-02-12

## 2021-03-09 ENCOUNTER — TRANSCRIPTION ENCOUNTER (OUTPATIENT)
Age: 51
End: 2021-03-09

## 2021-04-05 ENCOUNTER — TRANSCRIPTION ENCOUNTER (OUTPATIENT)
Age: 51
End: 2021-04-05

## 2021-06-09 ENCOUNTER — APPOINTMENT (OUTPATIENT)
Dept: FAMILY MEDICINE | Facility: CLINIC | Age: 51
End: 2021-06-09
Payer: COMMERCIAL

## 2021-06-09 PROCEDURE — 99443: CPT

## 2021-06-20 ENCOUNTER — TRANSCRIPTION ENCOUNTER (OUTPATIENT)
Age: 51
End: 2021-06-20

## 2021-11-10 ENCOUNTER — TRANSCRIPTION ENCOUNTER (OUTPATIENT)
Age: 51
End: 2021-11-10

## 2021-12-21 ENCOUNTER — APPOINTMENT (OUTPATIENT)
Dept: FAMILY MEDICINE | Facility: CLINIC | Age: 51
End: 2021-12-21
Payer: COMMERCIAL

## 2021-12-21 VITALS — SYSTOLIC BLOOD PRESSURE: 114 MMHG | TEMPERATURE: 97.8 F | DIASTOLIC BLOOD PRESSURE: 70 MMHG

## 2021-12-21 DIAGNOSIS — Z87.898 PERSONAL HISTORY OF OTHER SPECIFIED CONDITIONS: ICD-10-CM

## 2021-12-21 DIAGNOSIS — R21 RASH AND OTHER NONSPECIFIC SKIN ERUPTION: ICD-10-CM

## 2021-12-21 DIAGNOSIS — H93.8X1 OTHER SPECIFIED DISORDERS OF RIGHT EAR: ICD-10-CM

## 2021-12-21 PROCEDURE — 99215 OFFICE O/P EST HI 40 MIN: CPT

## 2021-12-21 RX ORDER — NYSTATIN AND TRIAMCINOLONE ACETONIDE 100000; 1 MG/G; MG/G
100000-0.1 CREAM TOPICAL TWICE DAILY
Qty: 1 | Refills: 3 | Status: ACTIVE | COMMUNITY
Start: 2018-05-15 | End: 1900-01-01

## 2021-12-21 NOTE — HISTORY OF PRESENT ILLNESS
[FreeTextEntry8] : 50 y/o F recently diagnosed One follicular lymphoma presents for right ear clogged for several days. Worse in am then intermittent. Getting better. Some righg sided nose bleeding. Hearing normal. Just finished Chemo. HAir thinning . POssible hot flashes (secondary to chemo causing early menopause(. Still getting menses . Had vaginal discharge few days ago. Saw GYN locally did vaginal C&S. GC/Chlamydia NEG. Not itchy non malodorous.

## 2021-12-21 NOTE — PHYSICAL EXAM
[Normal] : affect was normal and insight and judgment were intact [de-identified] : well bordered darker erythema trifgone

## 2021-12-29 ENCOUNTER — APPOINTMENT (OUTPATIENT)
Dept: OBGYN | Facility: CLINIC | Age: 51
End: 2021-12-29
Payer: COMMERCIAL

## 2021-12-29 VITALS
BODY MASS INDEX: 21.79 KG/M2 | WEIGHT: 123 LBS | SYSTOLIC BLOOD PRESSURE: 114 MMHG | HEIGHT: 62.8 IN | DIASTOLIC BLOOD PRESSURE: 75 MMHG

## 2021-12-29 DIAGNOSIS — Z01.818 ENCOUNTER FOR OTHER PREPROCEDURAL EXAMINATION: ICD-10-CM

## 2021-12-29 PROCEDURE — 99203 OFFICE O/P NEW LOW 30 MIN: CPT

## 2021-12-29 NOTE — PHYSICAL EXAM
[Labia Majora] : normal [Labia Minora] : normal [Discharge] : a  ~M vaginal discharge was present [Moderate] : moderate [White] : white [Normal] : normal [Uterine Adnexae] : normal

## 2021-12-29 NOTE — HISTORY OF PRESENT ILLNESS
[Patient reported PAP Smear was normal] : Patient reported PAP Smear was normal [N] : Patient denies prior pregnancies [FreeTextEntry1] : Patient getting chemo for the past 6 months for lymphoma\par She has had a discharge and saw a GYN and had an GC and Chlamydia culture \par LMP 8/20 but no menses since\par Hot flashes\par Patient told not to have pap until chemo is completed  [Mammogramdate] : 2021 [TextBox_19] : negative breast biopsy  [BreastSonogramDate] : 2021 [PapSmeardate] : 2020  [TextBox_31] : had cryo 30 years ago  [ColonoscopyDate] : 2021 [TextBox_43] : lymphoma diagnosed a colonoscopy

## 2021-12-29 NOTE — PLAN
[FreeTextEntry1] : Patient most likely with BV. \par Will treat and wait for culture \par Patient will return for full exam

## 2021-12-31 ENCOUNTER — TRANSCRIPTION ENCOUNTER (OUTPATIENT)
Age: 51
End: 2021-12-31

## 2021-12-31 LAB
CANDIDA VAG CYTO: NOT DETECTED
G VAGINALIS+PREV SP MTYP VAG QL MICRO: DETECTED
T VAGINALIS VAG QL WET PREP: NOT DETECTED

## 2022-01-03 ENCOUNTER — TRANSCRIPTION ENCOUNTER (OUTPATIENT)
Age: 52
End: 2022-01-03

## 2022-01-18 ENCOUNTER — TRANSCRIPTION ENCOUNTER (OUTPATIENT)
Age: 52
End: 2022-01-18

## 2022-01-18 RX ORDER — METRONIDAZOLE 7.5 MG/G
0.75 GEL VAGINAL
Qty: 1 | Refills: 0 | Status: ACTIVE | COMMUNITY
Start: 2021-12-29 | End: 1900-01-01

## 2022-02-09 ENCOUNTER — TRANSCRIPTION ENCOUNTER (OUTPATIENT)
Age: 52
End: 2022-02-09

## 2022-02-09 ENCOUNTER — NON-APPOINTMENT (OUTPATIENT)
Age: 52
End: 2022-02-09

## 2022-04-20 ENCOUNTER — TRANSCRIPTION ENCOUNTER (OUTPATIENT)
Age: 52
End: 2022-04-20

## 2022-08-15 ENCOUNTER — APPOINTMENT (OUTPATIENT)
Dept: FAMILY MEDICINE | Facility: CLINIC | Age: 52
End: 2022-08-15

## 2022-08-15 DIAGNOSIS — Z11.59 ENCOUNTER FOR SCREENING FOR OTHER VIRAL DISEASES: ICD-10-CM

## 2022-08-15 PROCEDURE — 36415 COLL VENOUS BLD VENIPUNCTURE: CPT

## 2022-08-16 ENCOUNTER — APPOINTMENT (OUTPATIENT)
Dept: ORTHOPEDIC SURGERY | Facility: CLINIC | Age: 52
End: 2022-08-16

## 2022-08-16 VITALS
HEART RATE: 71 BPM | DIASTOLIC BLOOD PRESSURE: 78 MMHG | BODY MASS INDEX: 23.92 KG/M2 | SYSTOLIC BLOOD PRESSURE: 114 MMHG | HEIGHT: 62 IN | WEIGHT: 130 LBS

## 2022-08-16 DIAGNOSIS — M75.42 IMPINGEMENT SYNDROME OF LEFT SHOULDER: ICD-10-CM

## 2022-08-16 DIAGNOSIS — M25.512 PAIN IN LEFT SHOULDER: ICD-10-CM

## 2022-08-16 PROCEDURE — 99203 OFFICE O/P NEW LOW 30 MIN: CPT

## 2022-08-16 PROCEDURE — 73030 X-RAY EXAM OF SHOULDER: CPT | Mod: LT

## 2022-08-16 RX ORDER — DICLOFENAC SODIUM 75 MG/1
75 TABLET, DELAYED RELEASE ORAL
Qty: 1 | Refills: 0 | Status: ACTIVE | COMMUNITY
Start: 2022-08-16 | End: 1900-01-01

## 2022-08-16 NOTE — REASON FOR VISIT
[Initial Visit] : an initial visit for [FreeTextEntry2] : left shoulder pain for 2 months sudden onset

## 2022-08-16 NOTE — DISCUSSION/SUMMARY
[de-identified] : This patient presents today for evaluation regarding left shoulder pain ongoing for about 2 months after lifting some heavy object from the backseat of the car.  Her physical exam and x-rays consistent with left shoulder impingement.  I discussed the diagnosis with the patient at length as well as treatment options.  I recommended a course of diclofenac 75 mg twice a day for the next 2 weeks.  I like to see her back in 2 weeks if the symptoms do not improve.  At that time would recommend a subacromial steroid injection to the shoulder.  Instructions are given regarding taking the diclofenac.  In addition instructions are given regarding reduced activities and modification of activities for the left upper extremity.  At least 30 minutes was spent performing the evaluation and management on today's office visit.

## 2022-08-16 NOTE — PHYSICAL EXAM
[de-identified] : The patient appears well nourished  and in no apparent distress.  The patient is alert and oriented to person, place, and time.   Affect and mood appear normal.    The head is normocephalic and atraumatic.  The eyes reveal normal sclera and extra ocular muscles are intact.   The neck appears normal with no jugular venous distention or masses noted.   Skin shows normal turgor with no evidence of eczema or psoriasis.  No respiratory distress noted.  The patient ambulates with a normal gait.\par \par The left shoulder has range of motion showing some mild loss of internal rotation behind the back.  There is discomfort with internal rotation behind the back.  There is a positive Carter sign.  Negative impingement sign.  There is no tenderness to palpation.  .   Rotator cuff strength is normal.    There is no soft tissue swelling.  There is no eccyhmosis.  There is no warmth or erythema.    There is no instabililty on exam.  No lymphadenopathy or edema is noted.  Pulses and capillary refill are normal.  There is no muscular atrophy.  Strength and sensation are intact distally.   [de-identified] : AP, outlet,  and glenoid and axillary views of the left shoulder were obtained.  The glenohumeral and acromioclavicular joints are well maintained without evidence of degenerative arthritis.   There is no fracture, dislocation, or subluxation.

## 2022-08-16 NOTE — HISTORY OF PRESENT ILLNESS
[de-identified] : This patient presents today with a 2-month history of left shoulder pain.  She states that the pain was of sudden onset.  She does yoga 1-2 times a week.  She notes pain with certain yoga movements.  She feels at the time that the pain started she was reaching behind the back seat of her car and lifting a heavy object.  This caused pain at that time.  Pain is noted with reaching overhead and reaching behind the back.  Pain is intermittent.  Currently not taking any NSAIDs.  She denies clicking or locking about the shoulder.  Pain localized to the lateral deltoid area.

## 2022-08-17 ENCOUNTER — NON-APPOINTMENT (OUTPATIENT)
Age: 52
End: 2022-08-17

## 2022-08-17 LAB — SARS-COV-2 N GENE NPH QL NAA+PROBE: NOT DETECTED

## 2022-08-19 ENCOUNTER — NON-APPOINTMENT (OUTPATIENT)
Age: 52
End: 2022-08-19

## 2022-08-24 ENCOUNTER — TRANSCRIPTION ENCOUNTER (OUTPATIENT)
Age: 52
End: 2022-08-24

## 2022-08-24 RX ORDER — AZELASTINE HYDROCHLORIDE 205.5 UG/1
0.15 SPRAY, METERED NASAL TWICE DAILY
Qty: 1 | Refills: 6 | Status: ACTIVE | COMMUNITY
Start: 2019-07-08 | End: 1900-01-01

## 2022-09-02 ENCOUNTER — TRANSCRIPTION ENCOUNTER (OUTPATIENT)
Age: 52
End: 2022-09-02

## 2022-09-02 RX ORDER — BENZONATATE 200 MG/1
200 CAPSULE ORAL 3 TIMES DAILY
Qty: 30 | Refills: 0 | Status: ACTIVE | COMMUNITY
Start: 2022-09-02 | End: 1900-01-01

## 2022-12-13 ENCOUNTER — APPOINTMENT (OUTPATIENT)
Dept: FAMILY MEDICINE | Facility: CLINIC | Age: 52
End: 2022-12-13

## 2022-12-13 ENCOUNTER — NON-APPOINTMENT (OUTPATIENT)
Age: 52
End: 2022-12-13

## 2022-12-13 VITALS
RESPIRATION RATE: 18 BRPM | BODY MASS INDEX: 23.55 KG/M2 | OXYGEN SATURATION: 98 % | HEART RATE: 61 BPM | WEIGHT: 128 LBS | DIASTOLIC BLOOD PRESSURE: 64 MMHG | SYSTOLIC BLOOD PRESSURE: 110 MMHG | TEMPERATURE: 96.2 F | HEIGHT: 62 IN

## 2022-12-13 DIAGNOSIS — F41.9 ANXIETY DISORDER, UNSPECIFIED: ICD-10-CM

## 2022-12-13 PROCEDURE — 99396 PREV VISIT EST AGE 40-64: CPT | Mod: 25

## 2022-12-13 PROCEDURE — 93000 ELECTROCARDIOGRAM COMPLETE: CPT

## 2022-12-13 NOTE — HISTORY OF PRESENT ILLNESS
[de-identified] : 51 y/o F PMHx Recent dx of B-Cell Lymphoma presents to office for routine PE Pt diagnosed with BV 9 months ago  . Vaginal discharge resolved 2 months ago.  Completed Covid vaccines and Flu shot. Sees Onc 2-3 times yearly LASt scan 9/2022 .Pt  in remission.

## 2023-01-06 ENCOUNTER — APPOINTMENT (OUTPATIENT)
Dept: OBGYN | Facility: CLINIC | Age: 53
End: 2023-01-06
Payer: COMMERCIAL

## 2023-01-06 VITALS
WEIGHT: 120 LBS | BODY MASS INDEX: 22.08 KG/M2 | HEIGHT: 62 IN | SYSTOLIC BLOOD PRESSURE: 116 MMHG | DIASTOLIC BLOOD PRESSURE: 80 MMHG

## 2023-01-06 DIAGNOSIS — Z87.42 PERSONAL HISTORY OF OTHER DISEASES OF THE FEMALE GENITAL TRACT: ICD-10-CM

## 2023-01-06 DIAGNOSIS — Z76.89 PERSONS ENCOUNTERING HEALTH SERVICES IN OTHER SPECIFIED CIRCUMSTANCES: ICD-10-CM

## 2023-01-06 PROCEDURE — 99213 OFFICE O/P EST LOW 20 MIN: CPT

## 2023-02-02 ENCOUNTER — TRANSCRIPTION ENCOUNTER (OUTPATIENT)
Age: 53
End: 2023-02-02

## 2023-02-03 ENCOUNTER — TRANSCRIPTION ENCOUNTER (OUTPATIENT)
Age: 53
End: 2023-02-03

## 2023-02-13 ENCOUNTER — TRANSCRIPTION ENCOUNTER (OUTPATIENT)
Age: 53
End: 2023-02-13

## 2023-02-17 ENCOUNTER — NON-APPOINTMENT (OUTPATIENT)
Age: 53
End: 2023-02-17

## 2023-04-06 RX ORDER — AZELASTINE HYDROCHLORIDE 137 UG/1
0.1 SPRAY, METERED NASAL TWICE DAILY
Qty: 1 | Refills: 5 | Status: ACTIVE | COMMUNITY
Start: 2023-04-06 | End: 1900-01-01

## 2023-04-11 ENCOUNTER — TRANSCRIPTION ENCOUNTER (OUTPATIENT)
Age: 53
End: 2023-04-11

## 2023-04-17 ENCOUNTER — TRANSCRIPTION ENCOUNTER (OUTPATIENT)
Age: 53
End: 2023-04-17

## 2023-04-26 ENCOUNTER — TRANSCRIPTION ENCOUNTER (OUTPATIENT)
Age: 53
End: 2023-04-26

## 2023-06-12 ENCOUNTER — RX RENEWAL (OUTPATIENT)
Age: 53
End: 2023-06-12

## 2023-06-19 ENCOUNTER — APPOINTMENT (OUTPATIENT)
Dept: OBGYN | Facility: CLINIC | Age: 53
End: 2023-06-19
Payer: COMMERCIAL

## 2023-06-19 VITALS
HEIGHT: 62 IN | DIASTOLIC BLOOD PRESSURE: 76 MMHG | SYSTOLIC BLOOD PRESSURE: 118 MMHG | BODY MASS INDEX: 23.37 KG/M2 | WEIGHT: 127 LBS

## 2023-06-19 DIAGNOSIS — Z01.419 ENCOUNTER FOR GYNECOLOGICAL EXAMINATION (GENERAL) (ROUTINE) W/OUT ABNORMAL FINDINGS: ICD-10-CM

## 2023-06-19 DIAGNOSIS — N95.2 POSTMENOPAUSAL ATROPHIC VAGINITIS: ICD-10-CM

## 2023-06-19 PROCEDURE — 99396 PREV VISIT EST AGE 40-64: CPT

## 2023-06-19 NOTE — HISTORY OF PRESENT ILLNESS
[FreeTextEntry1] : Patient here for annual visit\par Still complaining of difficulty with intercourse \par Used estrogen tablets 3x a week for one month with some improvement but is still experiencing difficulty\par  [Mammogramdate] : 2022 [PapSmeardate] : 2019

## 2023-06-19 NOTE — PLAN
[FreeTextEntry1] : Patient advised to increase estrogen cream to 3x a week for 3 months \par She will look into insurance coverage for Osphena

## 2023-06-21 LAB — HPV HIGH+LOW RISK DNA PNL CVX: NOT DETECTED

## 2023-06-26 LAB — CYTOLOGY CVX/VAG DOC THIN PREP: NORMAL

## 2023-08-20 ENCOUNTER — TRANSCRIPTION ENCOUNTER (OUTPATIENT)
Age: 53
End: 2023-08-20

## 2023-08-20 RX ORDER — DIAZEPAM 5 MG/1
5 TABLET ORAL
Qty: 30 | Refills: 0 | Status: ACTIVE | COMMUNITY
Start: 2017-07-06 | End: 1900-01-01

## 2023-09-14 ENCOUNTER — TRANSCRIPTION ENCOUNTER (OUTPATIENT)
Age: 53
End: 2023-09-14

## 2023-09-15 ENCOUNTER — NON-APPOINTMENT (OUTPATIENT)
Age: 53
End: 2023-09-15

## 2023-09-16 ENCOUNTER — TRANSCRIPTION ENCOUNTER (OUTPATIENT)
Age: 53
End: 2023-09-16

## 2023-11-14 DIAGNOSIS — Z00.00 ENCOUNTER FOR GENERAL ADULT MEDICAL EXAMINATION W/OUT ABNORMAL FINDINGS: ICD-10-CM

## 2023-11-21 ENCOUNTER — APPOINTMENT (OUTPATIENT)
Dept: GASTROENTEROLOGY | Facility: CLINIC | Age: 53
End: 2023-11-21
Payer: COMMERCIAL

## 2023-11-21 ENCOUNTER — LABORATORY RESULT (OUTPATIENT)
Age: 53
End: 2023-11-21

## 2023-11-21 PROCEDURE — 45385 COLONOSCOPY W/LESION REMOVAL: CPT | Mod: 33

## 2023-12-14 ENCOUNTER — NON-APPOINTMENT (OUTPATIENT)
Age: 53
End: 2023-12-14

## 2023-12-14 ENCOUNTER — APPOINTMENT (OUTPATIENT)
Dept: FAMILY MEDICINE | Facility: CLINIC | Age: 53
End: 2023-12-14
Payer: COMMERCIAL

## 2023-12-14 VITALS
HEART RATE: 62 BPM | TEMPERATURE: 97.8 F | BODY MASS INDEX: 23.55 KG/M2 | HEIGHT: 62 IN | WEIGHT: 128 LBS | RESPIRATION RATE: 18 BRPM | OXYGEN SATURATION: 96 % | SYSTOLIC BLOOD PRESSURE: 126 MMHG | DIASTOLIC BLOOD PRESSURE: 70 MMHG

## 2023-12-14 DIAGNOSIS — J30.89 OTHER ALLERGIC RHINITIS: ICD-10-CM

## 2023-12-14 DIAGNOSIS — K59.09 OTHER CONSTIPATION: ICD-10-CM

## 2023-12-14 DIAGNOSIS — Z13.6 ENCOUNTER FOR SCREENING FOR CARDIOVASCULAR DISORDERS: ICD-10-CM

## 2023-12-14 DIAGNOSIS — C85.10 UNSPECIFIED B-CELL LYMPHOMA, UNSPECIFIED SITE: ICD-10-CM

## 2023-12-14 DIAGNOSIS — G47.00 INSOMNIA, UNSPECIFIED: ICD-10-CM

## 2023-12-14 DIAGNOSIS — Z00.00 ENCOUNTER FOR GENERAL ADULT MEDICAL EXAMINATION W/OUT ABNORMAL FINDINGS: ICD-10-CM

## 2023-12-14 PROCEDURE — 99396 PREV VISIT EST AGE 40-64: CPT | Mod: 25

## 2023-12-14 PROCEDURE — 93000 ELECTROCARDIOGRAM COMPLETE: CPT

## 2023-12-14 RX ORDER — ESTRADIOL 10 UG/1
10 TABLET VAGINAL
Qty: 84 | Refills: 3 | Status: COMPLETED | COMMUNITY
Start: 2023-02-13 | End: 2023-12-14

## 2023-12-14 NOTE — HISTORY OF PRESENT ILLNESS
[de-identified] : 52 y/o F PMHx  Asthma (Singular, Astelin)Recent dx of B-Cell Lymphoma presents to office for routine PE Pt diagnosed with BV 9 months ago  . Vaginal discharge resolved 2 months ago.   Had Flu shot. Sees Onc 2-3 times yearly  . Scan no changes.

## 2024-03-08 ENCOUNTER — TRANSCRIPTION ENCOUNTER (OUTPATIENT)
Age: 54
End: 2024-03-08

## 2024-07-02 ENCOUNTER — TRANSCRIPTION ENCOUNTER (OUTPATIENT)
Age: 54
End: 2024-07-02

## 2024-07-29 ENCOUNTER — APPOINTMENT (OUTPATIENT)
Dept: OBGYN | Facility: CLINIC | Age: 54
End: 2024-07-29

## 2024-07-29 VITALS
SYSTOLIC BLOOD PRESSURE: 122 MMHG | WEIGHT: 123 LBS | DIASTOLIC BLOOD PRESSURE: 83 MMHG | HEIGHT: 62 IN | BODY MASS INDEX: 22.63 KG/M2

## 2024-07-29 DIAGNOSIS — Z01.419 ENCOUNTER FOR GYNECOLOGICAL EXAMINATION (GENERAL) (ROUTINE) W/OUT ABNORMAL FINDINGS: ICD-10-CM

## 2024-07-29 PROCEDURE — 99396 PREV VISIT EST AGE 40-64: CPT

## 2024-07-29 PROCEDURE — G0444 DEPRESSION SCREEN ANNUAL: CPT | Mod: 59

## 2024-07-29 RX ORDER — ESTRADIOL 0.1 MG/G
0.1 CREAM VAGINAL
Qty: 1 | Refills: 0 | Status: ACTIVE | COMMUNITY
Start: 2024-07-29 | End: 1900-01-01

## 2024-07-29 NOTE — PHYSICAL EXAM
[Chaperone Declined] : Patient declined chaperone [Examination Of The Breasts] : a normal appearance [No Masses] : no breast masses were palpable [Vulvar Atrophy] : vulvar atrophy [Labia Majora] : normal [Labia Minora] : normal [Atrophy] : atrophy [Normal] : normal [Uterine Adnexae] : normal [FreeTextEntry4] : narrow entrance

## 2024-07-29 NOTE — PLAN
[FreeTextEntry1] : Significant vaginal atrophy Will try estrogen cream and recommend dilators to help make the opening bigger  Patient screened for depression. No signs of clinical depression. PHQ-9 scores reviewed over the course of the visit and 5-10 minutes of face to face time spent. Follow up with changes in mood including other symptoms of anxiety.

## 2024-08-12 ENCOUNTER — TRANSCRIPTION ENCOUNTER (OUTPATIENT)
Age: 54
End: 2024-08-12

## 2024-09-25 ENCOUNTER — TRANSCRIPTION ENCOUNTER (OUTPATIENT)
Age: 54
End: 2024-09-25

## 2024-10-08 ENCOUNTER — TRANSCRIPTION ENCOUNTER (OUTPATIENT)
Age: 54
End: 2024-10-08

## 2025-01-22 ENCOUNTER — TRANSCRIPTION ENCOUNTER (OUTPATIENT)
Age: 55
End: 2025-01-22

## 2025-01-22 DIAGNOSIS — Z00.00 ENCOUNTER FOR GENERAL ADULT MEDICAL EXAMINATION W/OUT ABNORMAL FINDINGS: ICD-10-CM

## 2025-01-23 ENCOUNTER — TRANSCRIPTION ENCOUNTER (OUTPATIENT)
Age: 55
End: 2025-01-23

## 2025-02-11 ENCOUNTER — APPOINTMENT (OUTPATIENT)
Dept: FAMILY MEDICINE | Facility: CLINIC | Age: 55
End: 2025-02-11

## 2025-03-14 ENCOUNTER — APPOINTMENT (OUTPATIENT)
Dept: FAMILY MEDICINE | Facility: CLINIC | Age: 55
End: 2025-03-14
Payer: COMMERCIAL

## 2025-03-14 ENCOUNTER — NON-APPOINTMENT (OUTPATIENT)
Age: 55
End: 2025-03-14

## 2025-03-14 VITALS
HEART RATE: 86 BPM | OXYGEN SATURATION: 96 % | DIASTOLIC BLOOD PRESSURE: 74 MMHG | WEIGHT: 120 LBS | BODY MASS INDEX: 22.08 KG/M2 | HEIGHT: 62 IN | TEMPERATURE: 97.9 F | RESPIRATION RATE: 16 BRPM | SYSTOLIC BLOOD PRESSURE: 120 MMHG

## 2025-03-14 DIAGNOSIS — F41.9 ANXIETY DISORDER, UNSPECIFIED: ICD-10-CM

## 2025-03-14 DIAGNOSIS — Z13.6 ENCOUNTER FOR SCREENING FOR CARDIOVASCULAR DISORDERS: ICD-10-CM

## 2025-03-14 DIAGNOSIS — Z00.00 ENCOUNTER FOR GENERAL ADULT MEDICAL EXAMINATION W/OUT ABNORMAL FINDINGS: ICD-10-CM

## 2025-03-14 DIAGNOSIS — C85.10 UNSPECIFIED B-CELL LYMPHOMA, UNSPECIFIED SITE: ICD-10-CM

## 2025-03-14 DIAGNOSIS — Z71.85 ENCOUNTER FOR IMMUNIZATION SAFETY COUNSELING: ICD-10-CM

## 2025-03-14 PROCEDURE — 99396 PREV VISIT EST AGE 40-64: CPT

## 2025-03-14 PROCEDURE — 93000 ELECTROCARDIOGRAM COMPLETE: CPT
